# Patient Record
(demographics unavailable — no encounter records)

---

## 2025-02-27 NOTE — HISTORY OF PRESENT ILLNESS
[Retired] : Work status: retired [5] : 5 [Dull/Aching] : dull/aching [Intermittent] : intermittent [Leisure] : leisure [Nothing helps with pain getting better] : Nothing helps with pain getting better [de-identified] : 02/27/2025 :HILARIA TAO , a 77 year old female, presents today for  left middle and right index finger pain. Previously has had 3 CSI injections to left thumb. She underwent CSI for left middle and right index finger July 2024 which offered relief but has since worn off.   Retired, previously  ANDRZEJ [] : no [de-identified] : Dr. Alicea

## 2025-02-27 NOTE — ASSESSMENT
[FreeTextEntry1] : The patient was advised of the diagnosis. The natural history of the pathology was explained in full to the patient in layman's terms. All questions were answered. The risks and benefits of surgical and non-surgical treatment alternatives were explained in full to the patient.  We reviewed the anatomy of the flexor sheath and pathology of trigger fingers with the use of drawings and discussion.  We discussed the treatment options including splinting/nsaids, injection and surgery.  We discussed that too many injections may lead to weakening of the tendon/tendon rupture and the safety of two injections. After a discussion of the risks, benefits and alternatives along with the expectations, the patient was amenable to injection.  The indication for injection is pain and inflammation.  The skin overlying the tendon sheath/A1 pulley site was prepared with alcohol and ethyl chloride was sprayed topically.  Sterile technique was used. An injection of 1ml of lidocaine and 6mg of betamethasone was used.  The patient was instructed to call if redness, pain or fever occur.  They may apply ice for 15 minutes every hour for the next 12-24 hours as tolerated.  The patient understands that it may take 2-5 days to see a noticeable difference.  Sterile Band-Aid was applied.   CSI #1 performed to left middle and right index finger trigger fingers.  We again reviewed the anatomy of the flexor sheath and pathology of trigger fingers with the use of drawings and discussion.  The patient has failed injections/nonoperative treatment.  We discussed the possibility of surgery including the use of an open trigger finger release.  We discussed that too many injections may lead to weakening of the tendon/tendon rupture and that surgery is indicated at this point.  Risks include bleeding, infection, injury to nerves, vessels, tendons, stiffness, pain, loss of range of motion, loss of function, CRPS, and risks and complications of anesthesia.  We discussed the surgical plan and post op expectations.  We also discussed the possibility of prolonged pain/scar tissue and the possible need for therapy.  The patient is amenable to the risks, had the opportunity to ask questions, all questions were answered and the patient signed consent of their own accord.  They will be contacted by my surgical scheduler.   Patient agrees to proceed with LEFT trigger thumb release.  RTO after surgery.

## 2025-02-27 NOTE — IMAGING
[de-identified] : left middle finger, thumb TTP a1 pulley +triggering otherwise farom NVID  right index finger TTP a1 pulley +triggering otherwise farom neurovascular intact distally

## 2025-04-01 NOTE — HISTORY OF PRESENT ILLNESS
[FreeTextEntry1] : The patient is here for a follow up visit to review their medications and chronic medical conditions.  hypothyroid [de-identified] : chronic lbp with severe foraminal stenosis anxiety/depression on sertraline needs something for back pain under tremendous stress taking care of  with dementia who needs 24 hr help.  Reviewed all interim as well as relevant prior consultations, labs and radiological studies.

## 2025-04-01 NOTE — ASSESSMENT
[FreeTextEntry1] : htn-stable, cont losartan, low Na diet hld- diet chk labs anxiety- stable, sertraline seb derm- triam cr obesity- discussed low calorie diet and exercise as part of a weight loss plan. discussed glp1s. she will think about it. lumbar foraminal stenosis- severe. needs a pain med. will try tramadol. use only once a day. on zoloft 50mg(low dose). referred to [facility].  Discussed healthy lifestyle,discussed and updated vaccinations, healthy diet, exercise, chk labs, discussed appropriate screening tests including colonoscopy, mammo, bone density and pap.  Discussed the importance and benefit of a healthy lifestyle including a heart healthy diet such as the Mediterranean diet and regular exercise as well as 7 hours of sleep nightly.  discussed prevna rprev. pt will think about it. overall resistant to vaccines.  letter also written for pt  that she needs for her to be able to get more HHA to help with her .  Total time spent 40 min. ( 25 min was FTF and 15 min was chart review and documentation for a total of 40 min. ).

## 2025-05-29 NOTE — HISTORY OF PRESENT ILLNESS
[FreeTextEntry1] : lucy [de-identified] : Here for follow up visit. Doing well. Appetitie, sleep,bms,voiding, mood all ok.  Interim and relevant labs, imaging and consultations reviewed.

## 2025-05-29 NOTE — ASSESSMENT
[FreeTextEntry1] : htn-stable, cont losartan, low Na diet hld- diet chk labs anxiety- stable, sertraline seb derm- triam cr obesity- discussed low calorie diet and exercise as part of a weight loss plan. discussed glp1s. she will think about it. lumbar foraminal stenosis- severe. needs a randal beer pain n med.  tramadol. use only once a day. on zoloft 50mg(low dose). referred to dr miguel Discussed healthy lifestyle,discussed and updated vaccinations, healthy diet, exercise, chk labs, discussed appropriate screening tests including colonoscopy, mammo, bone density and pap.  Discussed the importance and benefit of a healthy lifestyle including a heart healthy diet such as the Mediterranean diet and regular exercise as well as 7 hours of sleep nightly.  discussed prevna rprev. pt will think about it. overall resistant to vaccines.   Total time spent 40 min. ( 25 min was FTF and 15 min was chart review and documentation for a total of 40 min. ).  [Vaccines Reviewed] : Immunizations reviewed today. Please see immunization details in the vaccine log within the immunization flowsheet.

## 2025-05-29 NOTE — HEALTH RISK ASSESSMENT
[Good] : ~his/her~  mood as  good [2 - 3 times a week (3 pts)] : 2 - 3  times a week (3 points) [1 or 2 (0 pts)] : 1 or 2 (0 points) [Never (0 pts)] : Never (0 points) [No] : In the past 12 months have you used drugs other than those required for medical reasons? No [No falls in past year] : Patient reported no falls in the past year [0] : 2) Feeling down, depressed, or hopeless: Not at all (0) [PHQ-2 Negative - No further assessment needed] : PHQ-2 Negative - No further assessment needed [Yes] : Reviewed medication list for presence of high-risk medications. [Never] : Never [Patient declined PAP Smear] : Patient declined PAP Smear [Patient reported bone density results were abnormal] : Patient reported bone density results were abnormal [Patient reported colonoscopy was normal] : Patient reported colonoscopy was normal [None] : None [With Significant Other] : lives with significant other [Retired] : retired [] :  [Fully functional (bathing, dressing, toileting, transferring, walking, feeding)] : Fully functional (bathing, dressing, toileting, transferring, walking, feeding) [Fully functional (using the telephone, shopping, preparing meals, housekeeping, doing laundry, using] : Fully functional and needs no help or supervision to perform IADLs (using the telephone, shopping, preparing meals, housekeeping, doing laundry, using transportation, managing medications and managing finances) [Audit-CScore] : 3 [de-identified] : chair yoga [de-identified] : reg  [AZS2Lunmn] : 0 [Change in mental status noted] : No change in mental status noted [Language] : denies difficulty with language [Behavior] : denies difficulty with behavior [Handling Complex Tasks] : denies difficulty handling complex tasks [Reasoning] : denies difficulty with reasoning [Reports changes in hearing] : Reports no changes in hearing [Reports changes in vision] : Reports no changes in vision [Reports changes in dental health] : Reports no changes in dental health [MammogramDate] : 2022 [BoneDensityDate] : 2022 [BoneDensityComments] : osteopenia [ColonoscopyDate] : 2022 [AdvancecareDate] : 5/29/25

## 2025-07-22 NOTE — PHYSICAL EXAM
[Normal] : soft, non-tender, non-distended, no masses palpated, no HSM and normal bowel sounds [de-identified] : bilateral sacroilieac tenderness. negative SLR, some suprpubic tenderness

## 2025-07-22 NOTE — HISTORY OF PRESENT ILLNESS
[FreeTextEntry8] : Patient presnts with history of Chronic lower back pain, without radiation to legs. denies muscle weakness in lower extremities, numbness or tingling. Patient lost  after long battled with dementia. Has engaged with PT, chiropractics, however no epidurals. sleeps well. Is a former dancer, and is frustrated by reduced mobility. NSAIDs somewhat helpful

## 2025-07-22 NOTE — PHYSICAL EXAM
[Normal] : soft, non-tender, non-distended, no masses palpated, no HSM and normal bowel sounds [de-identified] : bilateral sacroilieac tenderness. negative SLR, some suprpubic tenderness

## 2025-07-22 NOTE — REVIEW OF SYSTEMS
[Joint Stiffness] : joint stiffness [Muscle Pain] : muscle pain [Back Pain] : back pain [Negative] : Genitourinary [Muscle Weakness] : no muscle weakness [FreeTextEntry8] : states vaginal irriation for a long time [de-identified] : greiving loss of

## 2025-07-22 NOTE — REVIEW OF SYSTEMS
[Joint Stiffness] : joint stiffness [Muscle Pain] : muscle pain [Back Pain] : back pain [Negative] : Genitourinary [Muscle Weakness] : no muscle weakness [FreeTextEntry8] : states vaginal irriation for a long time [de-identified] : greiving loss of

## 2025-07-22 NOTE — PLAN
[FreeTextEntry1] : discussed R/B/A to MM pt will f/u with multiple providers certified for one year

## 2025-07-30 NOTE — DISCUSSION/SUMMARY
[Medication Risks Reviewed] : Medication risks reviewed [de-identified] : Assessment: - Summary : Chronic low back pain associated with scoliosis and possible disc protrusion noted on imaging. Pain exacerbated by prolonged sitting and relieved by medication, stretching, and physical therapy. Patient reports improvement following recent prednisone course. Tight hamstrings and bruised sensation along the right flank suggest localized chronic inflammation. Neurological examination is unremarkable with no signs of acute nerve compression. - Problems : - Chronic low back pain  - Scoliosis (curve 36 degrees)  - Possible disc protrusion with associated lumbar degeneration.  - High blood pressure  - Differential Diagnosis : - Scoliosis-related back pain with associated degeneration  - Lumbar disc protrusion  - Chronic inflammation with possible arthritis  - Localized nerve compression leading to stiffness  Plan: - Summary : Patient will continue stretching exercises such as yoga or Pilates and consider water-based activities such as swimming. Prescription for muscle relaxants was provided along with continuation of ibuprofen for pain relief. Further diagnostics include MRI review to assess stenosis or progression of scoliosis. Patient will follow up with physical therapy focusing on scoliosis management and stretching/mobility improvement. Scoliosis degree will be monitored with repeat imaging in 2-3 years. - Plan : - Physical therapy referral for stretching exercises and scoliosis support  - Referral for MRI of lumbar spine to evaluate stenosis and disc protrusion progression  - Muscle relaxant prescription sent to patient's pharmacy, tizanidine prescribed  - Continued use of ibuprofen for pain relief with self monitoring for adverse effects  - Encouraged ongoing yoga and Pilates for flexibility  - Follow-up x-ray in 2-3 years to monitor scoliosis progression, entire spine x-ray would be recommended standing PA and lateral

## 2025-07-30 NOTE — PHYSICAL EXAM
[LE] : Sensory: Intact in bilateral lower extremities [Knee] : patellar 2+ and symmetric bilaterally [Ankle] : ankle 2+ and symmetric bilaterally [SLR] : negative straight leg raise [de-identified] : Patient's daughter was on the telephone during the visit and her questions were answered to her satisfaction [de-identified] : 4 views lumbar spine obtained today demonstrate right-sided lumbar curve from T11-L4 measuring 36 degrees.  She is osteopenia.  Normal lumbar lordosis.  Degenerative changes at the L3-4 L2-3 and L1-2 levels.  No acute fractures.  No dynamic instability between flexion-extension  Office Location: [address] [place], [place] [address]Gregorio lindsey [address] Office Phone: [phone] Office Fax: [phone] Patient Name: Yessi Hernandez Patient Phone Number: [phone] Patient ID: [id number] : 1947 Date of Exam: 2025 R. Phys. Name: Taurus Alona R. Phys. Address: [address] [place] [address] R. Phys. Phone: [phone] EXAM: HIP BILATERAL WITH PELVIS 3 OR 4 VIEW  HISTORY: R10.30 Right/ Left Pain Groin  COMPARISON: May 12th, 2022  TECHNIQUE: Standing AP pelvis, frog leg lateral of both the right and left hips.  FINDINGS: The bony pelvis, acetabula, bilateral inferior and superior pubic rami demonstrate intact cortical margins with no evidence of an acute fracture.  There is unchanged mild osteitis pubis  Right femoral head, neck and visualized proximal shaft demonstrate intact cortical margins with no evidence of an acute fracture.  The right hip joint space is well maintained with no significant degenerative changes.  Left femoral head, neck and visualized proximal shaft demonstrate intact cortical margins with no evidence of an acute fracture.  The left hip joint space is well maintained with no significant degenerative changes.  There are no lytic nor blastic lesions.  Mild sacroiliac degenerative changes are present.  IMPRESSION:  Normal bilateral hip radiographs.  Mild bilateral sacroiliac degenerative changes   Electronically Signed By: Steven Mendelsohn, M.D., on 2025 7:27 PM   SIGNED BY: Steven Mendelsohn, M.D., Ext. [phone] 07:27 PM

## 2025-07-30 NOTE — HISTORY OF PRESENT ILLNESS
[de-identified] : - Summary : Patient reports chronic low back pain that has been persistent for over 20 years. Pain is localized in the low back and right flank without radiation to the legs but associated with stiffness and tightness. The patient mentions recent improvement after a short course of prednisone prescribed by a nurse practitioner. Pain worsens when standing up after sitting, with delayed ability to straighten and start walking. She feels a bruised sensation upon palpation of the bilateral flanks and reports loss of flexibility. Currently engages in chair yoga that has improved her mobility. She denies leg pain but notes occasional tiredness in her legs. She has high blood pressure for which she is on medication and has past surgical history related to trigger finger corrections. Additionally, she fractured her leg requiring rods and screws. No current numbness, tingling, or acute neurological deficits in the lower extremities noted. - Chief Complaint (CC) : Chronic low back pain with stiffness and tightness localized to the right flank. - History of Present Illness (HPI) : Patient reports chronic low back pain for approximately 20 years, primarily localized without radiation to the legs. Pain is described as stiffness and tightness in the right flank area, occasionally feeling bruised upon palpation bilateral. She associates delayed ability to stand straight after sitting with back stiffness. Noted recent improvement after a short course of prednisone prescribed for suspected inflammation. She engages in chair yoga and has regained some mobility. No new issues with walking or fatigue in the legs. MRI findings show scoliosis with a curve measuring 36 degrees, and additional reference to a large bulging disc with potential associated arthritis and possible nerve compression leading to symptoms. Patient denies numbness, tingling, or sciatica. Reports no recent epidurals but has undergone physical therapy last year. - Past Medical History : - High blood pressure  - Chronic low back pain associated with scoliosis  - Chronic trigger finger requiring several surgeries and injections  - Past Surgical History : Patient has undergone surgical correction for trigger finger multiple times and sustained a significant leg fracture requiring rods and screws after falling. Scarring present from previous surgeries. - Family History : - Social History : - Retired   - Engages in chair yoga to improve flexibility  - No substance use reported  - [facility] is used for prescriptions  - Other History : - Review of Systems : - General: Denies fever, fatigue, or weight loss.  - HEENT: No complaints reported.  - Cardiovascular: History of hypertension; denies chest pain, dyspnea, or palpitations.  - Respiratory: No cough, wheezing, or breathlessness.  - Gastrointestinal: No reported nausea, vomiting, or abdominal discomfort.  - Musculoskeletal: Reports chronic low back pain, stiffness, and bruising sensation; tight hamstrings noted; some improvement with stretching and prednisone; previous leg fracture requiring rods and screws.  - Neurological: Denies numbness, tingling, or weakness; strength and reflexes in lower extremities intact; mild tightness noted in hamstrings, no other neurological issues identified.  - Psychiatric: No anxiety, depression, or other psychological concerns expressed.  - Medications : - Prednisone (completed course, recently prescribed) (Inactive)  - Ibuprofen 600 mg as-needed (Active)  - High blood pressure medication (details not provided) (Active)  - Allergies : - No Known Drug Allergies

## 2025-07-31 NOTE — PHYSICAL EXAM
[Normal] : soft, non-tender, non-distended, no masses palpated, no HSM and normal bowel sounds [de-identified] : external labia with thin, white, and wrinkled, skin. perirectal area red and inflammed  [de-identified] : bilateral sacroilieac tenderness. negative SLR, some suprpubic tenderness

## 2025-07-31 NOTE — REVIEW OF SYSTEMS
[Dysuria] : dysuria [Joint Stiffness] : joint stiffness [Muscle Weakness] : no muscle weakness [Muscle Pain] : muscle pain [Back Pain] : back pain [Negative] : Genitourinary [FreeTextEntry8] : states vaginal irriation for a long time. itchy, sometimes painful [de-identified] : greiving loss of

## 2025-07-31 NOTE — PHYSICAL EXAM
[Normal] : soft, non-tender, non-distended, no masses palpated, no HSM and normal bowel sounds [de-identified] : external labia with thin, white, and wrinkled, skin. perirectal area red and inflammed  [de-identified] : bilateral sacroilieac tenderness. negative SLR, some suprpubic tenderness

## 2025-07-31 NOTE — REVIEW OF SYSTEMS
[Dysuria] : dysuria [Joint Stiffness] : joint stiffness [Muscle Weakness] : no muscle weakness [Muscle Pain] : muscle pain [Back Pain] : back pain [Negative] : Genitourinary [FreeTextEntry8] : states vaginal irriation for a long time. itchy, sometimes painful [de-identified] : greiving loss of

## 2025-07-31 NOTE — PLAN
[FreeTextEntry1] : m/l labial lichen sclerosus and perirectal fungal dermatitis    discussed R/B/A to MM pt will f/u with multiple providers certified for one year

## 2025-07-31 NOTE — HISTORY OF PRESENT ILLNESS
[FreeTextEntry8] : 25: Patient states that she has had irritation in genital area for many months. Had been treated in the past for labial irritation in the past, and was on steroid cream in the past. Patient has not been sexually active in years due to husbands illness. He is now    25: Patient presents with history of Chronic lower back pain, without radiation to legs. denies muscle weakness in lower extremities, numbness or tingling. Patient lost  after long battled with dementia. Has engaged with PT, chiropractics, however no epidurals. sleeps well. Is a former dancer, and is frustrated by reduced mobility. NSAIDs somewhat helpful